# Patient Record
Sex: FEMALE | Race: OTHER | URBAN - METROPOLITAN AREA
[De-identification: names, ages, dates, MRNs, and addresses within clinical notes are randomized per-mention and may not be internally consistent; named-entity substitution may affect disease eponyms.]

---

## 2024-06-10 ENCOUNTER — EMERGENCY (EMERGENCY)
Facility: HOSPITAL | Age: 29
LOS: 0 days | Discharge: HOME | End: 2024-06-10
Attending: STUDENT IN AN ORGANIZED HEALTH CARE EDUCATION/TRAINING PROGRAM
Payer: COMMERCIAL

## 2024-06-10 VITALS
OXYGEN SATURATION: 99 % | WEIGHT: 149.91 LBS | DIASTOLIC BLOOD PRESSURE: 71 MMHG | TEMPERATURE: 98 F | RESPIRATION RATE: 20 BRPM | SYSTOLIC BLOOD PRESSURE: 117 MMHG | HEIGHT: 68 IN | HEART RATE: 105 BPM

## 2024-06-10 VITALS
TEMPERATURE: 98 F | RESPIRATION RATE: 18 BRPM | SYSTOLIC BLOOD PRESSURE: 118 MMHG | HEART RATE: 90 BPM | DIASTOLIC BLOOD PRESSURE: 75 MMHG | OXYGEN SATURATION: 98 %

## 2024-06-10 DIAGNOSIS — R07.89 OTHER CHEST PAIN: ICD-10-CM

## 2024-06-10 DIAGNOSIS — T78.1XXA OTHER ADVERSE FOOD REACTIONS, NOT ELSEWHERE CLASSIFIED, INITIAL ENCOUNTER: ICD-10-CM

## 2024-06-10 DIAGNOSIS — R42 DIZZINESS AND GIDDINESS: ICD-10-CM

## 2024-06-10 DIAGNOSIS — Z91.013 ALLERGY TO SEAFOOD: ICD-10-CM

## 2024-06-10 PROCEDURE — 93010 ELECTROCARDIOGRAM REPORT: CPT

## 2024-06-10 PROCEDURE — 99284 EMERGENCY DEPT VISIT MOD MDM: CPT

## 2024-06-10 RX ORDER — SODIUM CHLORIDE 9 MG/ML
1000 INJECTION INTRAMUSCULAR; INTRAVENOUS; SUBCUTANEOUS ONCE
Refills: 0 | Status: COMPLETED | OUTPATIENT
Start: 2024-06-10 | End: 2024-06-10

## 2024-06-10 RX ADMIN — SODIUM CHLORIDE 1000 MILLILITER(S): 9 INJECTION INTRAMUSCULAR; INTRAVENOUS; SUBCUTANEOUS at 20:23

## 2024-06-10 NOTE — ED PROVIDER NOTE - PHYSICAL EXAMINATION
Gen: NAD, AOx3, able to make needs known, non-toxic  Head: NCAT  HEENT: EOMI, oral mucosa moist, normal conjunctiva. no tongue/lip/uvula edema  Lung: no respiratory distress, CTAB, no wheezes/rhonchi/rales B/L, speaking in full sentences  CV: RRR, no murmurs  Abd: non distended, soft, nontender, no guarding, no CVA tenderness  MSK: no visible deformities  Neuro: Appears non focal  Skin: Warm, well perfused. erythema b/l forearms  Psych: normal affect

## 2024-06-10 NOTE — ED PROVIDER NOTE - PROGRESS NOTE DETAILS
Attending Brittany: pt feeling well. no return of symptoms. pt has another epi pen at home. already given decadron, no further steroids needed. recommended benadryl and pepcid. ready for DC.

## 2024-06-10 NOTE — ED PROVIDER NOTE - CLINICAL SUMMARY MEDICAL DECISION MAKING FREE TEXT BOX
29 y/o F w/ no sig PMH presenting w/ allergic reaction. Seen w/ . Reports was at [employer] today. Had eaten fries and fried chicken. Shortly after developed redness on arms, which they assumed was sun burn. However developed redness on her face and a difficulty breathing sensation w/ chest tightness.  gave pt her epi pen, approx at 7pm. EMS was called and they provided decadron and benadryl. Pt reports feeling dizzy after the benadryl. Is allergic to shrimp and other seafood. Denies fevers, chills, headache, blurred vision, cough, shortness of breath, abdominal pain, n/v/d/c, urinary symptoms, MSK pain. pt overall well appearing. No current signs of anaphylaxis. Will give pepcid and IVF. Monitor for period of ED to ensure no rebound reaction. Will reassess the need for additional interventions as clinically warranted. Refer to any progress notes for updates on clinical course and as a continuation of this MDM.

## 2024-06-10 NOTE — ED PROVIDER NOTE - PATIENT PORTAL LINK FT
You can access the FollowMyHealth Patient Portal offered by [facility] by registering at the following website: [email or web address]. By joining [facility]’s FollowMyHealth portal, you will also be able to view your health information using other applications (apps) compatible with our system.

## 2024-06-10 NOTE — ED ADULT NURSE NOTE - CHIEF COMPLAINT QUOTE
s/p allergic reaction at [employer] game ate rice and chicken tenders reports diffused rash, redness and chest pain. pt self injected EPI , EMS gave Dex and benadryl via 20 G L forearm. PT speaks in complete sentences , no respiratory distress note pt reports improvement in symptoms.

## 2024-06-10 NOTE — ED ADULT NURSE NOTE - OBJECTIVE STATEMENT
28Y A&OX4 F no PMH, known shellfish allergy pw allergic rxn, joined with  at bedside, reports was at cricket match earlier where she had fried chicken and french fries. upon arrival to house, pt began developing warm, itchy redness on B/L arms, knees, and face, chest pressure and sob. pt self injected epi pen @1900; ems was called, pt given decadron and benadryl. in ED, pt noted with redness to B/L arms, not c/o any symptoms at this time, able to complete in complete sentences. denies fevers, chills, headache, blurred vision, cough, shortness of breath, abdominal pain, NVD, urinary symptoms

## 2024-06-10 NOTE — ED ADULT TRIAGE NOTE - CHIEF COMPLAINT QUOTE
s/p allergic reaction at [employer] ate rice and chicken tenders reports diffused rash, redness and chest pain. pt self injected EPI , EMS gave Dex and benadryl via 20 G L forearm. PT speaks in complete sentences , no respiratory distress note pt reports improvement in symptoms.

## 2024-06-10 NOTE — ED PROVIDER NOTE - NSFOLLOWUPINSTRUCTIONS_ED_ALL_ED_FT
1) Follow up with your doctor this week  2) Return to the ED immediately for new or worsening symptoms  3) Please continue to take any home medications as prescribed  4) You can take 25-50 mg of benadryl every 4-6 hours for allergic symptoms  5) You can also take famotidine 20 mg once a day for the next 5 days to help with allergic symptoms    Allergic Reaction    An allergic reaction is an abnormal reaction to a substance (allergen) by the body's defense system. Common allergens include medicines, food, insect bites or stings, and blood products. The body releases certain proteins into the blood that can cause a variety of symptoms such as an itchy rash, wheezing, swelling of the face/lips/tongue/throat, abdominal pain, nausea or vomiting. An allergic reaction is usually treated with medication. If your health care provider prescribed you an epinephrine injection device, make sure to keep it with you at all times.    SEEK IMMEDIATE MEDICAL CARE IF YOU HAVE ANY OF THE FOLLOWING SYMPTOMS: allergic reaction severe enough that required you to use epinephrine, tightness in your chest, swelling around your lips/tongue/throat, abdominal pain, vomiting or diarrhea, or lightheadedness/dizziness. These symptoms may represent a serious problem that is an emergency. Do not wait to see if the symptoms will go away. Use your auto-injector pen or anaphylaxis kit as you have been instructed. Call 911 and do not drive yourself to the hospital.

## 2024-06-10 NOTE — ED ADULT NURSE NOTE - NSFALLRISKINTERV_ED_ALL_ED
Assistance OOB with selected safe patient handling equipment if applicable/Assistance with ambulation/Communicate fall risk and risk factors to all staff, patient, and family/Encourage patient to sit up slowly, dangle for a short time, stand at bedside before walking/Monitor gait and stability/Orthostatic vital signs/Provide visual cue: yellow wristband, yellow gown, etc/Reinforce activity limits and safety measures with patient and family/Review medications for side effects contributing to fall risk/Toileting schedule using arm’s reach rule for commode and bathroom/Call bell, personal items and telephone in reach/Instruct patient to call for assistance before getting out of bed/chair/stretcher/Non-slip footwear applied when patient is off stretcher/Bodfish to call system/Physically safe environment - no spills, clutter or unnecessary equipment/Purposeful Proactive Rounding/Room/bathroom lighting operational, light cord in reach

## 2024-06-10 NOTE — ED PROVIDER NOTE - OBJECTIVE STATEMENT
27 y/o F w/ no sig PMH 29 y/o F w/ no sig PMH presenting w/ allergic reaction. Seen w/ . Reports was at [employer] today. Had eaten fries and fried chicken. Shortly after developed redness on arms, which they assumed was sun burn. However developed redness on her face and a difficulty breathing sensation w/ chest tightness.  gave pt her epi pen, approx at 7pm. EMS was called and they provided decadron and benadryl. Pt reports feeling dizzy after the benadryl. Is allergic to shrimp and other seafood. Denies fevers, chills, headache, blurred vision, cough, shortness of breath, abdominal pain, n/v/d/c, urinary symptoms, MSK pain.